# Patient Record
Sex: FEMALE
[De-identification: names, ages, dates, MRNs, and addresses within clinical notes are randomized per-mention and may not be internally consistent; named-entity substitution may affect disease eponyms.]

---

## 2019-03-20 ENCOUNTER — HOSPITAL ENCOUNTER (OUTPATIENT)
Dept: HOSPITAL 88 - MRI | Age: 29
End: 2019-03-20
Attending: FAMILY MEDICINE
Payer: COMMERCIAL

## 2019-03-20 DIAGNOSIS — M54.2: ICD-10-CM

## 2019-03-20 DIAGNOSIS — M54.6: Primary | ICD-10-CM

## 2019-03-20 DIAGNOSIS — M54.30: ICD-10-CM

## 2019-03-20 PROCEDURE — 72148 MRI LUMBAR SPINE W/O DYE: CPT

## 2019-03-20 PROCEDURE — 72146 MRI CHEST SPINE W/O DYE: CPT

## 2019-03-20 PROCEDURE — 72141 MRI NECK SPINE W/O DYE: CPT

## 2019-03-20 NOTE — DIAGNOSTIC IMAGING REPORT
Examination: MRI SPINE THORACIC WITHOUT CONTRAST



History: Back pain.

Comparison studies: None



Technique: 

Sagittal T1 and STIR; axial, sagittal and coronal T2

Intravenous contrast: None.



Findings:



Alignment: Normal kyphosis. No scoliosis.

Thoracic cord: Normal in signal and morphology. The tip of the conus is at

T12-L1.

Soft tissues: No T2 hyperintense inflammatory changes.

Paraspinal muscles: Preserved. No volume loss.



Vertebrae: 

No compression fractures, infection or neoplasm.



Degenerative changes:



Disc spaces:Normal in height and signal intensity..

Facets: Intact.

Spinal canal:Patent. No stenosis.

Foramina:Patent.

Disc bulge or herniations:No abnormality.





IMPRESSION: 

Normal thoracic spine MRI.



Signed by: Dr. Valerie Tinsley M.D. on 3/20/2019 5:31 PM

## 2019-03-20 NOTE — DIAGNOSTIC IMAGING REPORT
Examination: MRI SPINE CERVICAL WITHOUT CONTRAST



History: Neck pain.

Comparison studies: None

Technique: Sagittal T1, T2 and IR, axial T2 and axial gradient echo 

intravenous contrast: None 



Findings:



Alignment: Normal lordosis. No scoliosis.

Cervicomedullary junction: No abnormalities.  Patent foramen magnum.

Soft tissues: No T2 hyperintense inflammatory changes.

Spinal cord: Normal in size and signal from the foramen magnum through T1.



Vertebrae:  

No fractures, infection or neoplasm.



Degenerative changes:

C1-C2 through C7-T1:

No abnormalities.



IMPRESSION:



No degenerative disc or foraminal or canal stenosis.



Signed by: Dr. Valerie Tinsley M.D. on 3/20/2019 5:25 PM

## 2019-03-20 NOTE — DIAGNOSTIC IMAGING REPORT
Examination: MRI SPINE LUMBAR WITHOUT CONTRAST



History: Low back pain.

Comparison studies: None



Technique: 

Sagittal, coronal  and axial T2 , sagittal T1 and STIR; axial  spin density

oblique.



Findings:



Number of lumbar vertebral bodies: Five.



Alignment: Normal lordosis. No scoliosis.

Soft tissues: No T2 hyperintense inflammatory changes.

Posterior paraspinal soft tissues and muscles: No abnormality. 

Lower thoracic cord: Normal in signal and morphology. The tip of the conus is

at T12-L1.

Cauda equina: No masses.  No arachnoiditis.



Vertebrae: 

No fractures, infection or neoplasm.



Degenerative changes:



L1-L2 through L3-L4:

No abnormalities.



L4-L5:

Mild bilateral facet arthropathy with trace bilateral facet joint effusions.

No degenerative disc or foraminal or canal stenosis.



L5-S1:

Mild bilateral facet arthropathy with trace left facet joint effusion.

No degenerative disc or foraminal or canal stenosis.



IMPRESSION: 



Mild bilateral facet arthropathy at L4-L5 and L5-S1.



No degenerative disc or foraminal or canal stenosis.



Signed by: Dr. Valerie Tinsley M.D. on 3/20/2019 5:35 PM

## 2019-04-17 ENCOUNTER — HOSPITAL ENCOUNTER (EMERGENCY)
Dept: HOSPITAL 88 - FSED | Age: 29
Discharge: HOME | End: 2019-04-17
Payer: COMMERCIAL

## 2019-04-17 VITALS — HEIGHT: 61 IN | BODY MASS INDEX: 32.28 KG/M2 | WEIGHT: 171 LBS

## 2019-04-17 DIAGNOSIS — J20.9: ICD-10-CM

## 2019-04-17 DIAGNOSIS — R05: Primary | ICD-10-CM

## 2019-04-17 PROCEDURE — 99283 EMERGENCY DEPT VISIT LOW MDM: CPT

## 2019-04-17 PROCEDURE — 71046 X-RAY EXAM CHEST 2 VIEWS: CPT

## 2019-04-17 NOTE — XMS REPORT
Patient Summary Document

                             Created on: 2019



NEFTALY BRADLEY

External Reference #: [id number]

: 1990

Sex: Female



Demographics







                          Address                   [address] [place] [address] 

[place]  [address]

 

                          Home Phone                [phone]

 

                          Preferred Language        Unknown

 

                          Marital Status            Unknown

 

                          Sikhism Affiliation     Unknown

 

                          Race                      Unknown

 

                                        Additional Race(s)  

 

                          Ethnic Group              Unknown





Author







                          Author                    [facility]

 

                          Address                   Unknown

 

                          Phone                     Unavailable







Care Team Providers







                    Care Team Member Name    Role                Phone

 

                    GLENN PÉREZ    Unavailable         Unavailable







Problems

This patient has no known problems.



Allergies, Adverse Reactions, Alerts

This patient has no known allergies or adverse reactions.



Medications

This patient has no known medications.



Results







           Test Description    Test Time    Test Comments    Text Results    Atomic Results    Result

 Comments

 

                MRI SPINE LUMBAR WO    2019 17:31:00                                                       

                                                   [place] [address]      Patient Name: NEFTALY BRADLEY                                   
MR #: [id number]                     : 1990                            
      Age/Sex: 28/F  Acct #: [id number]                              Req #: 
[id number]  Adm Physician:                                                      
Ordered by: LANI PÉREZ D.O.                            Report #: [id number]  
     Location: MRI                                     Room/Bed:                
    
___________________________________________________________________________________________________
   Procedure: [id number] MRI/MRI SPINE LUMBAR WO  Exam Date:                     
       Exam Time:                                               REPORT STATUS: 
Signed    Examination: MRI SPINE LUMBAR WITHOUT CONTRAST      History: Low back 
pain.   Comparison studies: None      Technique:    Sagittal, coronal  and axial
T2 , sagittal T1 and STIR; axial  spin density   oblique.      Findings:      
Number of lumbar vertebral bodies: Five.      Alignment: Normal lordosis. No 
scoliosis.   Soft tissues: No T2 hyperintense inflammatory changes.   Posterior 
paraspinal soft tissues and muscles: No abnormality.    Lower thoracic cord: 
Normal in signal and morphology. The tip of the conus is   at T12-L1.   Cauda 
equina: No masses.  No arachnoiditis.      Vertebrae:    No fractures, infection
or neoplasm.      Degenerative changes:      L1-L2 through L3-L4:   No 
abnormalities.      L4-L5:   Mild bilateral facet arthropathy with trace 
bilateral facet joint effusions.   No degenerative disc or foraminal or canal 
stenosis.      L5-S1:   Mild bilateral facet arthropathy with trace left facet 
joint effusion.   No degenerative disc or foraminal or canal stenosis.      
IMPRESSION:       Mild bilateral facet arthropathy at L4-L5 and L5-S1.      No 
degenerative disc or foraminal or canal stenosis.      Signed by: Dr. Valerie Tinsley M.D. on 3/20/2019 5:35 PM        Dictated By: VALERIE HERRON MD  Electronically Signed By: VALERIE HERRON MD on 19  
Transcribed By: ELIAS on 19       COPY TO:   LANI PÉREZ D.O.    
                                         

 

                MRI SPINE THORACIC WO    2019 17:25:00                                                     

                                                     [place] [address]      Patient Name: NEFTALY BRADLEY                                   
MR #: [id number]                     : 1990                            
      Age/Sex: 28/F  Acct #: [id number]                              Req #: 
[id number]  Adm Physician:                                                      
Ordered by: LANI PÉREZ D.O.                            Report #: [id number]  
     Location: MRI                                     Room/Bed:                
    
___________________________________________________________________________________________________
   Procedure: [id number] MRI/MRI SPINE THORACIC WO  Exam Date:                   
         Exam Time:                                               REPORT STATUS:
Signed    Examination: MRI SPINE THORACIC WITHOUT CONTRAST      History: Back 
pain.   Comparison studies: None      Technique:    Sagittal T1 and STIR; axial,
sagittal and coronal T2   Intravenous contrast: None.      Findings:      
Alignment: Normal kyphosis. No scoliosis.   Thoracic cord: Normal in signal and 
morphology. The tip of the conus is at   T12-L1.   Soft tissues: No T2 
hyperintense inflammatory changes.   Paraspinal muscles: Preserved. No volume 
loss.      Vertebrae:    No compression fractures, infection or neoplasm.      
Degenerative changes:      Disc spaces:Normal in height and signal intensity..  
Facets: Intact.   Spinal canal:Patent. No stenosis.   Foramina:Patent.   Disc 
bulge or herniations:No abnormality.         IMPRESSION:    Normal thoracic 
spine MRI.      Signed by: Dr. Valerie Tinsley M.D. on 3/20/2019 5:31 PM      
 Dictated By: VALERIE HERRON MD  Electronically Signed By: VALERIE HERRON MD on 19  Transcribed By: ELIAS on 19   
   COPY TO:   LANI PÉREZ D.O.              

 

                MRI SPINE CERVICAL WO    2019 17:24:00                                                     

                                                     [place] [address]      Patient Name: NEFTALY BRADLEY                                   
MR #: [id number]                     : 1990                            
      Age/Sex: 28/F  Acct #: [id number]                              Req #: 
[id number]  Adm Physician:                                                      
Ordered by: LANI PÉREZ D.O.                            Report #: [id number]  
     Location: MRI                                     Room/Bed:                
    
___________________________________________________________________________________________________
   Procedure: [id number] MRI/MRI SPINE CERVICAL WO  Exam Date:                   
         Exam Time:                                               REPORT STATUS:
Signed    Examination: MRI SPINE CERVICAL WITHOUT CONTRAST      History: Neck 
pain.   Comparison studies: None   Technique: Sagittal T1, T2 and IR, axial T2 
and axial gradient echo    intravenous contrast: None       Findings:      
Alignment: Normal lordosis. No scoliosis.   Cervicomedullary junction: No 
abnormalities.  Patent foramen magnum.   Soft tissues: No T2 hyperintense 
inflammatory changes.   Spinal cord: Normal in size and signal from the foramen 
magnum through T1.      Vertebrae:     No fractures, infection or neoplasm.     
Degenerative changes:   C1-C2 through C7-T1:   No abnormalities.      
IMPRESSION:      No degenerative disc or foraminal or canal stenosis.      
Signed by: Dr. Valerie Tinsley M.D. on 3/20/2019 5:25 PM        Dictated By: 
VALERIE HERRON MD  Electronically Signed By: VALERIE HERRON MD on 19  Transcribed By: ELIAS on 19       COPY TO:   
LANI PÉREZ D.O.

## 2019-04-17 NOTE — DIAGNOSTIC IMAGING REPORT
EXAMINATION: PA and lateral views of the chest.



COMPARISON: None



CLINICAL HISTORY:  Cough, fever, congestion x5 days

     

DISCUSSION:



Lines/tubes:  None.



Lungs:  The lungs are well inflated and clear. There is no evidence of

pneumonia or pulmonary edema.



Pleura:  There is no pleural effusion or pneumothorax.



Heart and mediastinum:  The cardiomediastinal silhouette is normal.



Bones and soft tissues:  No acute bony abnormalities.      right upper quadrant

surgical clips likely reflect prior cholecystectomy.



IMPRESSION: 

No acute cardiopulmonary abnormalities.











Signed by: Dr. Yash Townsend M.D. on 4/17/2019 11:23 AM